# Patient Record
Sex: MALE | Race: WHITE | Employment: FULL TIME | URBAN - METROPOLITAN AREA
[De-identification: names, ages, dates, MRNs, and addresses within clinical notes are randomized per-mention and may not be internally consistent; named-entity substitution may affect disease eponyms.]

---

## 2017-03-27 ENCOUNTER — OFFICE VISIT (OUTPATIENT)
Dept: SURGERY | Age: 32
End: 2017-03-27

## 2017-03-27 VITALS
HEIGHT: 66 IN | WEIGHT: 182 LBS | TEMPERATURE: 97.8 F | SYSTOLIC BLOOD PRESSURE: 130 MMHG | BODY MASS INDEX: 29.25 KG/M2 | DIASTOLIC BLOOD PRESSURE: 81 MMHG

## 2017-03-27 DIAGNOSIS — K40.21 BILATERAL RECURRENT INGUINAL HERNIA WITHOUT OBSTRUCTION OR GANGRENE: Primary | ICD-10-CM

## 2017-03-27 PROCEDURE — 99214 OFFICE O/P EST MOD 30 MIN: CPT | Performed by: SURGERY

## 2017-03-29 ENCOUNTER — TELEPHONE (OUTPATIENT)
Dept: SURGERY | Age: 32
End: 2017-03-29

## 2017-03-31 RX ORDER — SODIUM CHLORIDE, SODIUM LACTATE, POTASSIUM CHLORIDE, CALCIUM CHLORIDE 600; 310; 30; 20 MG/100ML; MG/100ML; MG/100ML; MG/100ML
INJECTION, SOLUTION INTRAVENOUS CONTINUOUS
Status: CANCELLED | OUTPATIENT
Start: 2017-03-31

## 2017-04-03 ENCOUNTER — HOSPITAL ENCOUNTER (OUTPATIENT)
Dept: PREADMISSION TESTING | Age: 32
Discharge: HOME | End: 2017-04-03
Attending: SURGERY | Admitting: SURGERY

## 2017-04-03 VITALS — WEIGHT: 182 LBS | BODY MASS INDEX: 29.25 KG/M2 | HEIGHT: 66 IN

## 2017-04-03 RX ORDER — CHLORHEXIDINE GLUCONATE 0.12 MG/ML
15 RINSE ORAL 2 TIMES DAILY
Status: CANCELLED | OUTPATIENT
Start: 2017-04-03

## 2017-04-04 ENCOUNTER — HOSPITAL ENCOUNTER (OUTPATIENT)
Dept: SURGERY | Age: 32
Discharge: HOME | End: 2017-04-04
Admitting: SURGERY

## 2017-04-04 VITALS
DIASTOLIC BLOOD PRESSURE: 80 MMHG | SYSTOLIC BLOOD PRESSURE: 128 MMHG | HEART RATE: 73 BPM | WEIGHT: 180 LBS | RESPIRATION RATE: 16 BRPM | OXYGEN SATURATION: 99 % | BODY MASS INDEX: 28.93 KG/M2 | TEMPERATURE: 97.7 F | HEIGHT: 66 IN

## 2017-04-04 PROCEDURE — 49651 LAP ING HERNIA REPAIR RECUR: CPT | Performed by: SURGERY

## 2017-04-04 RX ORDER — LABETALOL HYDROCHLORIDE 5 MG/ML
5 INJECTION, SOLUTION INTRAVENOUS EVERY 10 MIN PRN
Status: DISCONTINUED | OUTPATIENT
Start: 2017-04-04 | End: 2017-04-05 | Stop reason: HOSPADM

## 2017-04-04 RX ORDER — CHLORHEXIDINE GLUCONATE 0.12 MG/ML
15 RINSE ORAL 2 TIMES DAILY
Status: DISCONTINUED | OUTPATIENT
Start: 2017-04-04 | End: 2017-04-05 | Stop reason: HOSPADM

## 2017-04-04 RX ORDER — FENTANYL CITRATE 50 UG/ML
50 INJECTION, SOLUTION INTRAMUSCULAR; INTRAVENOUS EVERY 5 MIN PRN
Status: DISCONTINUED | OUTPATIENT
Start: 2017-04-04 | End: 2017-04-05 | Stop reason: HOSPADM

## 2017-04-04 RX ORDER — ALBUTEROL SULFATE 2.5 MG/3ML
2.5 SOLUTION RESPIRATORY (INHALATION) ONCE
Status: COMPLETED | OUTPATIENT
Start: 2017-04-04 | End: 2017-04-04

## 2017-04-04 RX ORDER — HYDRALAZINE HYDROCHLORIDE 20 MG/ML
5 INJECTION INTRAMUSCULAR; INTRAVENOUS EVERY 10 MIN PRN
Status: DISCONTINUED | OUTPATIENT
Start: 2017-04-04 | End: 2017-04-05 | Stop reason: HOSPADM

## 2017-04-04 RX ORDER — ONDANSETRON 2 MG/ML
4 INJECTION INTRAMUSCULAR; INTRAVENOUS
Status: DISPENSED | OUTPATIENT
Start: 2017-04-04 | End: 2017-04-04

## 2017-04-04 RX ORDER — MEPERIDINE HYDROCHLORIDE 25 MG/ML
12.5 INJECTION INTRAMUSCULAR; INTRAVENOUS; SUBCUTANEOUS EVERY 5 MIN PRN
Status: DISCONTINUED | OUTPATIENT
Start: 2017-04-04 | End: 2017-04-05 | Stop reason: HOSPADM

## 2017-04-04 RX ORDER — OXYCODONE HYDROCHLORIDE AND ACETAMINOPHEN 5; 325 MG/1; MG/1
1 TABLET ORAL EVERY 4 HOURS PRN
Status: DISCONTINUED | OUTPATIENT
Start: 2017-04-04 | End: 2017-04-05 | Stop reason: HOSPADM

## 2017-04-04 RX ORDER — ACETAMINOPHEN 325 MG/1
650 TABLET ORAL EVERY 6 HOURS PRN
COMMUNITY

## 2017-04-04 RX ORDER — SODIUM CHLORIDE, SODIUM LACTATE, POTASSIUM CHLORIDE, CALCIUM CHLORIDE 600; 310; 30; 20 MG/100ML; MG/100ML; MG/100ML; MG/100ML
INJECTION, SOLUTION INTRAVENOUS CONTINUOUS
Status: DISCONTINUED | OUTPATIENT
Start: 2017-04-04 | End: 2017-04-05 | Stop reason: HOSPADM

## 2017-04-04 RX ORDER — OXYCODONE HYDROCHLORIDE AND ACETAMINOPHEN 5; 325 MG/1; MG/1
1 TABLET ORAL EVERY 6 HOURS PRN
Qty: 35 TABLET | Refills: 0 | Status: SHIPPED | OUTPATIENT
Start: 2017-04-04

## 2017-04-04 RX ORDER — ALBUTEROL SULFATE 2.5 MG/3ML
2.5 SOLUTION RESPIRATORY (INHALATION) ONCE
Status: DISCONTINUED | OUTPATIENT
Start: 2017-04-04 | End: 2017-04-05 | Stop reason: HOSPADM

## 2017-04-04 RX ORDER — PROMETHAZINE HYDROCHLORIDE 25 MG/ML
6.25 INJECTION, SOLUTION INTRAMUSCULAR; INTRAVENOUS
Status: ACTIVE | OUTPATIENT
Start: 2017-04-04 | End: 2017-04-04

## 2017-04-04 RX ORDER — FENTANYL CITRATE 50 UG/ML
25 INJECTION, SOLUTION INTRAMUSCULAR; INTRAVENOUS EVERY 5 MIN PRN
Status: DISCONTINUED | OUTPATIENT
Start: 2017-04-04 | End: 2017-04-05 | Stop reason: HOSPADM

## 2017-04-04 RX ADMIN — Medication 0.5 MG: at 13:10

## 2017-04-04 RX ADMIN — SODIUM CHLORIDE, SODIUM LACTATE, POTASSIUM CHLORIDE, CALCIUM CHLORIDE: 600; 310; 30; 20 INJECTION, SOLUTION INTRAVENOUS at 09:07

## 2017-04-04 RX ADMIN — OXYCODONE HYDROCHLORIDE AND ACETAMINOPHEN 1 TABLET: 5; 325 TABLET ORAL at 13:55

## 2017-04-04 RX ADMIN — ALBUTEROL SULFATE 2.5 MG: 2.5 SOLUTION RESPIRATORY (INHALATION) at 09:41

## 2017-04-04 ASSESSMENT — PAIN SCALES - GENERAL
PAINLEVEL_OUTOF10: 5
PAINLEVEL_OUTOF10: 4
PAINLEVEL_OUTOF10: 8
PAINLEVEL_OUTOF10: 4
PAINLEVEL_OUTOF10: 8

## 2017-04-04 ASSESSMENT — PAIN - FUNCTIONAL ASSESSMENT: PAIN_FUNCTIONAL_ASSESSMENT: 0-10

## 2017-04-04 ASSESSMENT — PAIN DESCRIPTION - PAIN TYPE
TYPE: SURGICAL PAIN
TYPE: SURGICAL PAIN

## 2017-04-04 ASSESSMENT — PAIN DESCRIPTION - LOCATION
LOCATION: ABDOMEN
LOCATION: ABDOMEN
LOCATION: INCISION;GROIN;ABDOMEN

## 2017-04-04 ASSESSMENT — PAIN DESCRIPTION - DESCRIPTORS
DESCRIPTORS: CRAMPING
DESCRIPTORS: ACHING;CONSTANT
DESCRIPTORS: CRAMPING

## 2017-04-04 ASSESSMENT — PAIN DESCRIPTION - FREQUENCY: FREQUENCY: CONTINUOUS

## 2017-04-04 ASSESSMENT — PAIN DESCRIPTION - ONSET: ONSET: GRADUAL

## 2017-04-10 ENCOUNTER — OFFICE VISIT (OUTPATIENT)
Dept: SURGERY | Age: 32
End: 2017-04-10

## 2017-04-10 VITALS
DIASTOLIC BLOOD PRESSURE: 83 MMHG | BODY MASS INDEX: 28.93 KG/M2 | WEIGHT: 180 LBS | SYSTOLIC BLOOD PRESSURE: 126 MMHG | HEIGHT: 66 IN | TEMPERATURE: 97.8 F

## 2017-04-10 DIAGNOSIS — K40.21 BILATERAL RECURRENT INGUINAL HERNIA WITHOUT OBSTRUCTION OR GANGRENE: Primary | ICD-10-CM

## 2017-04-10 PROCEDURE — 99024 POSTOP FOLLOW-UP VISIT: CPT | Performed by: SURGERY

## 2017-04-18 ENCOUNTER — TELEPHONE (OUTPATIENT)
Dept: SURGERY | Age: 32
End: 2017-04-18

## 2018-04-06 ENCOUNTER — OFFICE VISIT (OUTPATIENT)
Dept: SURGERY | Age: 33
End: 2018-04-06

## 2018-04-06 VITALS
TEMPERATURE: 98.1 F | HEIGHT: 66 IN | BODY MASS INDEX: 29.73 KG/M2 | DIASTOLIC BLOOD PRESSURE: 85 MMHG | SYSTOLIC BLOOD PRESSURE: 121 MMHG | WEIGHT: 185 LBS

## 2018-04-06 DIAGNOSIS — R10.31 RIGHT GROIN PAIN: Primary | ICD-10-CM

## 2018-04-06 PROCEDURE — 99213 OFFICE O/P EST LOW 20 MIN: CPT | Performed by: SURGERY

## 2018-04-10 DIAGNOSIS — R10.31 RIGHT GROIN PAIN: Primary | ICD-10-CM

## 2018-04-21 ENCOUNTER — HOSPITAL ENCOUNTER (OUTPATIENT)
Dept: CT IMAGING | Age: 33
Discharge: HOME | End: 2018-04-21
Attending: SURGERY | Admitting: SURGERY

## 2018-04-21 DIAGNOSIS — R10.31 RIGHT GROIN PAIN: ICD-10-CM

## 2018-04-21 DIAGNOSIS — R10.31 RIGHT LOWER QUADRANT PAIN: ICD-10-CM

## 2018-04-27 ENCOUNTER — OFFICE VISIT (OUTPATIENT)
Dept: SURGERY | Age: 33
End: 2018-04-27

## 2018-04-27 VITALS
TEMPERATURE: 98 F | SYSTOLIC BLOOD PRESSURE: 115 MMHG | BODY MASS INDEX: 30.05 KG/M2 | WEIGHT: 187 LBS | HEIGHT: 66 IN | DIASTOLIC BLOOD PRESSURE: 79 MMHG

## 2018-04-27 DIAGNOSIS — R10.31 RIGHT GROIN PAIN: Primary | ICD-10-CM

## 2018-04-27 PROCEDURE — 99213 OFFICE O/P EST LOW 20 MIN: CPT | Performed by: SURGERY

## 2023-08-28 ENCOUNTER — OFFICE VISIT (OUTPATIENT)
Dept: URGENT CARE | Age: 38
End: 2023-08-28

## 2023-08-28 VITALS
TEMPERATURE: 98.5 F | SYSTOLIC BLOOD PRESSURE: 118 MMHG | DIASTOLIC BLOOD PRESSURE: 84 MMHG | WEIGHT: 210 LBS | OXYGEN SATURATION: 98 % | HEIGHT: 67 IN | BODY MASS INDEX: 32.96 KG/M2 | HEART RATE: 77 BPM

## 2023-08-28 DIAGNOSIS — H60.311 DIFFUSE OTITIS EXTERNA OF RIGHT EAR, UNSPECIFIED CHRONICITY: Primary | ICD-10-CM

## 2023-08-28 PROBLEM — K40.90 INGUINAL HERNIA, RIGHT: Status: ACTIVE | Noted: 2017-03-03

## 2023-08-28 PROBLEM — R10.84 ABDOMINAL PAIN, GENERALIZED: Status: ACTIVE | Noted: 2017-03-03

## 2023-08-28 RX ORDER — IBUPROFEN 800 MG/1
800 TABLET ORAL EVERY 6 HOURS PRN
COMMUNITY

## 2023-08-28 RX ORDER — CIPROFLOXACIN AND DEXAMETHASONE 3; 1 MG/ML; MG/ML
4 SUSPENSION/ DROPS AURICULAR (OTIC) 2 TIMES DAILY
Qty: 7.5 ML | Refills: 0 | Status: SHIPPED | OUTPATIENT
Start: 2023-08-28 | End: 2023-09-04

## 2023-08-28 RX ORDER — AMOXICILLIN AND CLAVULANATE POTASSIUM 875; 125 MG/1; MG/1
1 TABLET, FILM COATED ORAL 2 TIMES DAILY
Qty: 20 TABLET | Refills: 0 | Status: SHIPPED | OUTPATIENT
Start: 2023-08-28 | End: 2023-09-07

## 2023-08-28 ASSESSMENT — ENCOUNTER SYMPTOMS
FACIAL SWELLING: 1
COUGH: 0
SORE THROAT: 0

## 2023-08-28 NOTE — PATIENT INSTRUCTIONS
Augmentin and Ciprodex drops as directed  Disconinue drops if painful or you start having drainage or bleeding from ear  Ibuprofen and Tylenol for pain  Follow up in 3 days for ear check

## 2023-09-07 ENCOUNTER — OFFICE VISIT (OUTPATIENT)
Dept: URGENT CARE | Age: 38
End: 2023-09-07

## 2023-09-07 VITALS
HEART RATE: 67 BPM | OXYGEN SATURATION: 95 % | DIASTOLIC BLOOD PRESSURE: 82 MMHG | HEIGHT: 67 IN | BODY MASS INDEX: 32.96 KG/M2 | TEMPERATURE: 98.2 F | WEIGHT: 210 LBS | SYSTOLIC BLOOD PRESSURE: 119 MMHG

## 2023-09-07 DIAGNOSIS — H60.311 ACUTE DIFFUSE OTITIS EXTERNA OF RIGHT EAR: Primary | ICD-10-CM

## 2023-09-07 RX ORDER — HYDROCORTISONE AND ACETIC ACID 20.75; 10.375 MG/ML; MG/ML
4 SOLUTION AURICULAR (OTIC) 3 TIMES DAILY
Qty: 10 ML | Refills: 0 | Status: SHIPPED | OUTPATIENT
Start: 2023-09-07 | End: 2023-09-17

## 2023-09-07 ASSESSMENT — ENCOUNTER SYMPTOMS
COUGH: 0
SORE THROAT: 0

## 2023-09-07 NOTE — PATIENT INSTRUCTIONS
Ear drop as directed  Keep ears dry  Tylenol or Ibuprofen for pain  Follow up if no improvement in symptoms